# Patient Record
Sex: MALE | Race: WHITE | Employment: FULL TIME | URBAN - METROPOLITAN AREA
[De-identification: names, ages, dates, MRNs, and addresses within clinical notes are randomized per-mention and may not be internally consistent; named-entity substitution may affect disease eponyms.]

---

## 2017-05-31 ENCOUNTER — HOSPITAL ENCOUNTER (OUTPATIENT)
Dept: LAB | Age: 42
Discharge: HOME | End: 2017-05-31
Payer: COMMERCIAL

## 2017-05-31 ENCOUNTER — OFFICE VISIT (OUTPATIENT)
Dept: FAMILY MEDICINE CLINIC | Age: 42
End: 2017-05-31

## 2017-05-31 VITALS
DIASTOLIC BLOOD PRESSURE: 82 MMHG | HEART RATE: 64 BPM | BODY MASS INDEX: 27.85 KG/M2 | WEIGHT: 217 LBS | SYSTOLIC BLOOD PRESSURE: 108 MMHG | HEIGHT: 74 IN | TEMPERATURE: 97.7 F | OXYGEN SATURATION: 98 % | RESPIRATION RATE: 16 BRPM

## 2017-05-31 DIAGNOSIS — G57.02 PIRIFORMIS SYNDROME OF LEFT SIDE: ICD-10-CM
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
## 2017-05-31 DIAGNOSIS — S30.861A TICK BITE OF GROIN, INITIAL ENCOUNTER: Primary | ICD-10-CM

## 2017-05-31 PROCEDURE — 86618 LYME DISEASE ANTIBODY: CPT | Performed by: FAMILY MEDICINE

## 2017-05-31 PROCEDURE — 36415 COLL VENOUS BLD VENIPUNCTURE: CPT | Performed by: FAMILY MEDICINE

## 2017-05-31 RX ORDER — MELOXICAM 15 MG/1
TABLET ORAL
Qty: 90 TAB | Refills: 0 | Status: SHIPPED | OUTPATIENT
Start: 2017-05-31 | End: 2017-09-04 | Stop reason: SDUPTHER

## 2017-05-31 RX ORDER — DOXYCYCLINE 100 MG/1
100 TABLET ORAL 2 TIMES DAILY
Qty: 28 TAB | Refills: 0 | Status: SHIPPED | OUTPATIENT
Start: 2017-05-31 | End: 2017-09-17

## 2017-05-31 NOTE — MR AVS SNAPSHOT
Visit Information Date & Time Provider Department Dept. Phone Encounter #  
 5/31/2017 10:20 AM Sonny Malone, [address] [facility] [id number] Follow-up Instructions Return in about 4 weeks (around 6/28/2017) for muscle tightness, lyme concern. Upcoming Health Maintenance Date Due DTaP/Tdap/Td series (1 - Tdap) 7/14/1994 INFLUENZA AGE 9 TO ADULT 8/1/2017 Allergies as of 5/31/2017  Review Complete On: 5/31/2017 By: Sonny Malone DO Severity Noted Reaction Type Reactions Neomycin  05/15/2015    Unknown (comments) Current Immunizations  Never Reviewed No immunizations on file. Not reviewed this visit You Were Diagnosed With   
  
 Codes Comments Tick bite, initial encounter    -  Primary ICD-10-CM: W57. Jarett Pagan ICD-9-CM: 919.4, E906.4 Piriformis syndrome of left side     ICD-10-CM: G57.02 
ICD-9-CM: 355.0 Vitals BP Pulse Temp Resp Height(growth percentile) Weight(growth percentile) 108/82 (BP 1 Location: Left arm, BP Patient Position: Sitting) 64 97.7 °F (36.5 °C) (Oral) 16 6' 2\" (1.88 m) 217 lb (98.4 kg) SpO2 BMI Smoking Status 98% 27.86 kg/m2 Former Smoker BMI and BSA Data Body Mass Index Body Surface Area  
 27.86 kg/m 2 2.27 m 2 Preferred Pharmacy Pharmacy Name Phone [id number] [address] Your Updated Medication List  
  
   
This list is accurate as of: 5/31/17 10:55 AM.  Always use your most recent med list.  
  
  
  
  
 doxycycline 100 mg tablet Commonly known as:  ADOXA Take 1 Tab by mouth two (2) times a day. HYDROcodone-acetaminophen 5-325 mg per tablet Commonly known as:  Cathy Mandril Take 1-2 Tabs by mouth every eight (8) hours as needed for Pain. Max Daily Amount: 6 Tabs. ibuprofen 800 mg tablet Commonly known as:  MOTRIN  
 Take 1 Tab by mouth every eight (8) hours as needed for Pain.  
  
 meloxicam 15 mg tablet Commonly known as:  MOBIC Take 1 tab daily or 1/2 tab twice daily as needed pain with food. Prescriptions Sent to Pharmacy Refills  
 meloxicam (MOBIC) 15 mg tablet 0 Sig: Take 1 tab daily or 1/2 tab twice daily as needed pain with food. Class: Normal  
 Pharmacy: [facility] #[id number] [place] [address] Ph #: [phone]  
 doxycycline (ADOXA) 100 mg tablet 0 Sig: Take 1 Tab by mouth two (2) times a day. Class: Normal  
 Pharmacy: [facility]acy #[id number] [place] [address] Ph #: [phone] Route: Oral  
  
Follow-up Instructions Return in about 4 weeks (around 6/28/2017) for muscle tightness, lyme concern. To-Do List   
 05/31/2017 Lab:  LYME AB TOTAL W/RFLX W BLOT Patient Instructions   
[email or web address] Search YouTube for my channel: 
 
Dr. Sifuentes Spargilberto Low back/Piriformis Stretch each muscle 30-45 seconds 5-6 times a day and be sure to do both sides. Definitely stretch after exercise, before bed, and when waking up. do this stretch 5 seconds each time 5 times in a row 4-6 times/day Form roll on painful side 3-5 minutes 2-3 times daily. Tick Bite: Care Instructions Your Care Instructions Ticks are small spiderlike animals. They bite to fasten themselves onto your skin and feed on your blood. Ticks can carry diseases. But most ticks do not carry diseases, and most tick bites do not cause serious health problems. Some people may have an allergic reaction to a tick bite. This reaction may be mild, with symptoms like itching and swelling. In rare cases, a severe allergic reaction may occur. Most of the time, all you need to do for a tick bite is relieve any symptoms you may have. Follow-up care is a key part of your treatment and safety. Be sure to make and go to all appointments, and call your doctor if you are having problems. It's also a good idea to know your test results and keep a list of the medicines you take. How can you care for yourself at home? · Put ice or a cold pack on the bite for 15 to 20 minutes once an hour. Put a thin cloth between the ice and your skin. · Try an over-the-counter medicine to relieve itching, redness, swelling, and pain. Be safe with medicines. Read and follow all instructions on the label. ¨ Take an antihistamine medicine, such as chlorpheniramine (Chlor-Trimeton) or diphenhydramine (Benadryl). These medicines may help relieve itching, redness, and swelling. ¨ Use a spray of local anesthetic that contains benzocaine, such as Solarcaine. It may help relieve pain. If your skin reacts to the spray, stop using it. ¨ Put calamine lotion on the skin. It may help relieve itching. To avoid tick bites · Avoid ticks: 
¨ Learn where ticks are found in your community, and stay away from those areas if possible. ¨ Cover as much of your body as possible when you work or play in grassy or wooded areas. ¨ Use insect repellents, such as products containing DEET. You can spray them on your skin. ¨ Take steps to control ticks on your property if you live in an area where Lyme disease occurs. Clear leaves, brush, tall grasses, woodpiles, and stone fences from around your house and the edges of your yard or garden. This may help get rid of ticks. · When you come in from outdoors, check your body for ticks, including your groin, head, and underarms. The ticks may be about the size of a sesame seed. If no one else can help you check for ticks on your scalp, comb your hair with a fine-tooth comb. · If you find a tick, remove it quickly. Use tweezers to grasp the tick as close to its mouth (the part in your skin) as possible.  Slowly pull the tick straight outdo not twist or yankuntil its mouth releases from your skin. · Ticks can come into your house on clothing, outdoor gear, and pets. These ticks can fall off and attach to you. ¨ Check your clothing and outdoor gear. Remove any ticks you find. Then put your clothing in a clothes dryer on high heat for 1 hour to kill any ticks that might remain. ¨ Check your pets for ticks after they have been outdoors. · When hiking in the woods, carry a small dry jar or ziplock bag. If you find a tick on your body, remove the tick and put it in the jar or bag. Store the container in the freezer so you can give it to your doctor if symptoms develop. The tick can be tested to learn whether it is carrying the bacteria that cause Lyme disease. When should you call for help? Call 911 anytime you think you may need emergency care. For example, call if: 
· You have symptoms of a severe allergic reaction. These may include: 
¨ Sudden raised, red areas (hives) all over your body. ¨ Swelling of the throat, mouth, lips, or tongue. ¨ Trouble breathing. ¨ Passing out (losing consciousness). Or you may feel very lightheaded or suddenly feel weak, confused, or restless. Call your doctor now or seek immediate medical care if: 
· You have signs of infection, such as: 
¨ Increased pain, swelling, warmth, or redness around the bite. ¨ Red streaks leading from the bite. ¨ Pus draining from the bite. ¨ A fever. Watch closely for changes in your health, and be sure to contact your doctor if: 
· You develop a new rash. · You have joint pain. · You are very tired. · You have flu-like symptoms. · You have symptoms for more than 1 week. Where can you learn more? Go to http://brinda-beth.info/. Enter E175 in the search box to learn more about \"Tick Bite: Care Instructions. \" Current as of: May 27, 2016 Content Version: 11.2 © 8596-6655 [employer].  Care instructions adapted under license by [address] (which disclaims liability or warranty for this information). If you have questions about a medical condition or this instruction, always ask your healthcare professional. [address] any warranty or liability for your use of this information. Piriformis Syndrome: Care Instructions Your Care Instructions The piriformis muscle is deep under your rear end (buttock). One end of the muscle connects deep inside the pelvic area, and the other end attaches to the top of the thighbone. This muscle can press on the sciatic nerve that runs from your spine down your leg. When this happens, you may have pain, numbness, and tingling in the buttock and down the back of your leg. This is called piriformis syndrome. The pain may get worse when you sit for a long time or climb stairs. Also, you may be more likely to develop piriformis syndrome if you run or walk often. Your doctor will check for other causes of your pain before treating this syndrome. Treatment may include stretching exercises, massage, and medicine for the pain and swelling. If these do not help, you may get a shot of steroid medicine. Until the pain is gone, you may need to rest the muscle and limit activities like running. Exercises and a change in how you move and sit may be enough to stop the pressure on the nerve. Follow-up care is a key part of your treatment and safety. Be sure to make and go to all appointments, and call your doctor if you are having problems. It's also a good idea to know your test results and keep a list of the medicines you take. How can you care for yourself at home? · If your doctor thinks that strenuous exercise is causing your problem, stop or cut back on activities such as running. You may find swimming to be a good exercise for a while. · Stretch the piriformis muscle. Vergie Saha on your back. ¨ Bend one leg at the knee and keep the other leg flat on the ground. ¨ Raise your bent knee up and then move it across your body. Hold the outside of the knee with the opposite hand. ¨ Gently pull the knee with your hand toward the opposite shoulder. ¨ Hold the stretch for at least 15 to 30 seconds. Switch legs. ¨ Do the stretch several times each day. · Massage the muscle to relieve pressure. ¨ Sit on the floor. Lean to one side so that the hip on your sore side is off the ground. Put a tennis ball under your buttock on that side. ¨ As you put weight onto the tennis ball, you may find spots that are especially sore. Move gently so that the tennis ball gently massages each of the sore spots. · Use ice or heat to help reduce pain. Put ice or a cold pack or a heating pad set on low or a warm cloth on the sore area for 10 to 20 minutes at a time. Put a thin cloth between the ice pack or heating pad and your skin. · Ask your doctor if you can take an over-the-counter pain medicine, such as acetaminophen (Tylenol), ibuprofen (Advil, Motrin), or naproxen (Aleve). Be safe with medicines. Read and follow all instructions on the label. · Have your doctor or a physical therapist watch how you move. You may need physical therapy or special inserts in your shoes (orthotics) to help you move in a way that does not put pressure on your nerves. When should you call for help? Watch closely for changes in your health, and be sure to contact your doctor if: 
· You do not feel better after several weeks of home care. · Your pain gets worse. · Your leg becomes weak or numb. Where can you learn more? Go to http://brinda-beth.info/. Enter M652 in the search box to learn more about \"Piriformis Syndrome: Care Instructions. \" Current as of: May 23, 2016 Content Version: 11.2 © 8768-7331 [employer].  Care instructions adapted under license by [employer] (which disclaims liability or warranty for this information). If you have questions about a medical condition or this instruction, always ask your healthcare professional. [address] any warranty or liability for your use of this information. Introducing [facility] SERVICES! [employer] introduces DataParenting patient portal. Now you can access parts of your medical record, email your doctor's office, and request medication refills online. 1. In your internet browser, go to [email or web address] 2. Click on the First Time User? Click Here link in the Sign In box. You will see the New Member Sign Up page. 3. Enter your DataParenting Access Code exactly as it appears below. You will not need to use this code after youve completed the sign-up process. If you do not sign up before the expiration date, you must request a new code. · DataParenting Access Code: [email or web address] Expires: 8/29/2017 10:55 AM 
 
4. Enter the last four digits of your Social Security Number (xxxx) and Date of Birth (mm/dd/yyyy) as indicated and click Submit. You will be taken to the next sign-up page. 5. Create a DataParenting ID. This will be your DataParenting login ID and cannot be changed, so think of one that is secure and easy to remember. 6. Create a DataParenting password. You can change your password at any time. 7. Enter your Password Reset Question and Answer. This can be used at a later time if you forget your password. 8. Enter your e-mail address. You will receive e-mail notification when new information is available in [address] 9. Click Sign Up. You can now view and download portions of your medical record. 10. Click the Download Summary menu link to download a portable copy of your medical information. If you have questions, please visit the Frequently Asked Questions section of the DataParenting website. Remember, DataParenting is NOT to be used for urgent needs. For medical emergencies, dial 911. Now available from your iPhone and Android! Please provide this summary of care documentation to your next provider. Lyme Disease Testing Disclaimer:   
 § 36.1-8583.4. (Expires July 1, 2018) Lyme disease testing information disclosure. A. Every licensee or his in-office designee who orders a laboratory test for the presence of Lyme disease shall provide to the patient or his legal representative the following written information: \"ACCORDING TO THE CENTERS FOR DISEASE CONTROL AND PREVENTION, AS OF 2011 LYME DISEASE IS THE SIXTH FASTEST GROWING DISEASE IN THE [place]. YOUR HEALTH CARE PROVIDER HAS ORDERED A LABORATORY TEST FOR THE PRESENCE OF LYME DISEASE FOR YOU. CURRENT LABORATORY TESTING FOR LYME DISEASE CAN BE PROBLEMATIC AND STANDARD LABORATORY TESTS OFTEN RESULT IN FALSE NEGATIVE AND FALSE POSITIVE RESULTS, AND IF DONE TOO EARLY, YOU MAY NOT HAVE PRODUCED ENOUGH ANTIBODIES TO BE CONSIDERED POSITIVE BECAUSE YOUR IMMUNE RESPONSE REQUIRES TIME TO DEVELOP ANTIBODIES. IF YOU ARE TESTED FOR LYME DISEASE, AND THE RESULTS ARE NEGATIVE, THIS DOES NOT NECESSARILY MEAN YOU DO NOT HAVE LYME DISEASE. IF YOU CONTINUE TO EXPERIENCE SYMPTOMS, YOU SHOULD CONTACT YOUR HEALTH CARE PROVIDER AND INQUIRE ABOUT THE APPROPRIATENESS OF RETESTING OR ADDITIONAL TREATMENT. \"  
B. Licensees shall be immune from civil liability for the provision of the written information required by this section absent gross negligence or willful misconduct. Your primary care clinician is listed as [address] [place] [address]. If you have any questions after today's visit, please call [phone].

## 2017-05-31 NOTE — LETTER
6/2/2017 8:02 AM 
 
Mr. Tony Camacho [address] [address] [address] Dear Tony Camacho: 
 
Please find your most recent results below. Labs are within normal limits. Totally negative for lyme disease. Resulted Orders LYME AB TOTAL W/RFLX W BLOT Result Value Ref Range Lyme Ab, IgG/IgM <0.91 0.00 - 0.90 ISR Comment:  
   (NOTE) Negative         <0.91 Equivocal  0.91 - 1.09 Positive         >1.09 Performed At: [address] [id number] Larry Harris MD JL:[id number] Please call me if you have any questions: [phone] Sincerely, 
 
Valente Desouza

## 2017-05-31 NOTE — PROGRESS NOTES
HISTORY OF PRESENT ILLNESS    José Miguel Sands is a 35y.o. year old male comes in today as new patient to be evaluated and treated for: lyme concern    Has been bit by ticks several times as many tics in yard as a lot of woods. Has been to PCP prior and tested for lyme which was neg but \"not an extensive panel\". Had been bit by tick on right thigh inner thigh 2 months ago and seemed to get rash and swell after. No fever but some muscle aches especially left glute and leg and did feel flu-like a couple weeks later. No eval for this prior. Does do a lot of exercise and noticed that has made him more sore especially left glute. Current Outpatient Prescriptions   Medication Sig Dispense Refill    ibuprofen (MOTRIN) 800 mg tablet Take 1 Tab by mouth every eight (8) hours as needed for Pain. 20 Tab 0    HYDROcodone-acetaminophen (NORCO) 5-325 mg per tablet Take 1-2 Tabs by mouth every eight (8) hours as needed for Pain. Max Daily Amount: 6 Tabs. 20 Tab 0     Past Medical History:   Diagnosis Date    VD (venereal disease)      Family History   Problem Relation Age of Onset    Cancer Mother     Cancer Paternal Grandmother     Diabetes Paternal Grandmother      Social History     Social History    Marital status:      Spouse name: N/A    Number of children: N/A    Years of education: N/A     Social History Main Topics    Smoking status: Former Smoker    Smokeless tobacco: Never Used    Alcohol use Yes    Drug use: None    Sexual activity: Not Asked     Other Topics Concern    None     Social History Narrative       ROS:  No weight loss. All other systems reviewed and negative aside from that written in the HPI. OBJECTIVE:  Visit Vitals    /82 (BP 1 Location: Left arm, BP Patient Position: Sitting)    Pulse 64    Temp 97.7 °F (36.5 °C) (Oral)    Resp 16    Ht 6' 2\" (1.88 m)    Wt 217 lb (98.4 kg)    SpO2 98%    BMI 27.86 kg/m2     GEN: Appears stated age in NAD.    HEENT: Conjunctiva/lids WNL. External canals/nares WNL. Tongue midline. PERRL, EOMI. Hearing intact. NECK: Trachea midline. Supple, Full ROM. CARDIAC: RRR. S1S2. No Murmur. No peripheral edema. LUNGS: CTAB w/ normal effort. ABD: Soft, NT.   MS: No clubbing/cyanosis. Steady gait. Strength +5/5 all extremities with full ROM and good tone. Tight and TTP hip flexor left  NEURO: Sensation intact light touch. Good coordination. SKIN: Warm/dry w/o rash. PSYCH: A+O x3. Appropriate judgment and insight. Assessment/Plan:   Encounter Diagnoses   Name Primary?  Tick bite of groin, initial encounter Yes    Piriformis syndrome of left side      Orders Placed This Encounter    LYME AB TOTAL W/RFLX W BLOT     Standing Status:   Future     Number of Occurrences:   1     Standing Expiration Date:   6/1/2018    meloxicam (MOBIC) 15 mg tablet     Sig: Take 1 tab daily or 1/2 tab twice daily as needed pain with food. Dispense:  90 Tab     Refill:  0    doxycycline (ADOXA) 100 mg tablet     Sig: Take 1 Tab by mouth two (2) times a day. Dispense:  28 Tab     Refill:  0     Patient verbalizes understanding of evaluation and plan. Will start mobic PRN and stretch as demo and await lab which I will notify call/letter and finish 14 days doxy and RTC 3-4 weeks.

## 2017-05-31 NOTE — PATIENT INSTRUCTIONS
[email or web address]    Search YouTube for my channel:    Dr. Harry Simental    Low back/Piriformis                Stretch each muscle 30-45 seconds 5-6 times a day and be sure to do both sides. Definitely stretch after exercise, before bed, and when waking up. do this stretch 5 seconds each time 5 times in a row 4-6 times/day          Form roll on painful side 3-5 minutes 2-3 times daily. Tick Bite: Care Instructions  Your Care Instructions    Ticks are small spiderlike animals. They bite to fasten themselves onto your skin and feed on your blood. Ticks can carry diseases. But most ticks do not carry diseases, and most tick bites do not cause serious health problems. Some people may have an allergic reaction to a tick bite. This reaction may be mild, with symptoms like itching and swelling. In rare cases, a severe allergic reaction may occur. Most of the time, all you need to do for a tick bite is relieve any symptoms you may have. Follow-up care is a key part of your treatment and safety. Be sure to make and go to all appointments, and call your doctor if you are having problems. It's also a good idea to know your test results and keep a list of the medicines you take. How can you care for yourself at home? · Put ice or a cold pack on the bite for 15 to 20 minutes once an hour. Put a thin cloth between the ice and your skin. · Try an over-the-counter medicine to relieve itching, redness, swelling, and pain. Be safe with medicines. Read and follow all instructions on the label. ¨ Take an antihistamine medicine, such as chlorpheniramine (Chlor-Trimeton) or diphenhydramine (Benadryl). These medicines may help relieve itching, redness, and swelling. ¨ Use a spray of local anesthetic that contains benzocaine, such as Solarcaine. It may help relieve pain. If your skin reacts to the spray, stop using it. ¨ Put calamine lotion on the skin.  It may help relieve itching. To avoid tick bites  · Avoid ticks:  ¨ Learn where ticks are found in your community, and stay away from those areas if possible. ¨ Cover as much of your body as possible when you work or play in grassy or wooded areas. ¨ Use insect repellents, such as products containing DEET. You can spray them on your skin. ¨ Take steps to control ticks on your property if you live in an area where Lyme disease occurs. Clear leaves, brush, tall grasses, woodpiles, and stone fences from around your house and the edges of your yard or garden. This may help get rid of ticks. · When you come in from outdoors, check your body for ticks, including your groin, head, and underarms. The ticks may be about the size of a sesame seed. If no one else can help you check for ticks on your scalp, comb your hair with a fine-tooth comb. · If you find a tick, remove it quickly. Use tweezers to grasp the tick as close to its mouth (the part in your skin) as possible. Slowly pull the tick straight out--do not twist or yank--until its mouth releases from your skin. · Ticks can come into your house on clothing, outdoor gear, and pets. These ticks can fall off and attach to you. ¨ Check your clothing and outdoor gear. Remove any ticks you find. Then put your clothing in a clothes dryer on high heat for 1 hour to kill any ticks that might remain. ¨ Check your pets for ticks after they have been outdoors. · When hiking in the woods, carry a small dry jar or ziplock bag. If you find a tick on your body, remove the tick and put it in the jar or bag. Store the container in the freezer so you can give it to your doctor if symptoms develop. The tick can be tested to learn whether it is carrying the bacteria that cause Lyme disease. When should you call for help? Call 911 anytime you think you may need emergency care. For example, call if:  · You have symptoms of a severe allergic reaction.  These may include:  ¨ Sudden raised, red areas (hives) all over your body. ¨ Swelling of the throat, mouth, lips, or tongue. ¨ Trouble breathing. ¨ Passing out (losing consciousness). Or you may feel very lightheaded or suddenly feel weak, confused, or restless. Call your doctor now or seek immediate medical care if:  · You have signs of infection, such as:  ¨ Increased pain, swelling, warmth, or redness around the bite. ¨ Red streaks leading from the bite. ¨ Pus draining from the bite. ¨ A fever. Watch closely for changes in your health, and be sure to contact your doctor if:  · You develop a new rash. · You have joint pain. · You are very tired. · You have flu-like symptoms. · You have symptoms for more than 1 week. Where can you learn more? Go to http://brinda-beth.info/. Enter G859 in the search box to learn more about \"Tick Bite: Care Instructions. \"  Current as of: May 27, 2016  Content Version: 11.2  © 7181-8100 [employer]. Care instructions adapted under license by [employer] (which disclaims liability or warranty for this information). If you have questions about a medical condition or this instruction, always ask your healthcare professional. [place] [address] any warranty or liability for your use of this information. Piriformis Syndrome: Care Instructions  Your Care Instructions    The piriformis muscle is deep under your rear end (buttock). One end of the muscle connects deep inside the pelvic area, and the other end attaches to the top of the thighbone. This muscle can press on the sciatic nerve that runs from your spine down your leg. When this happens, you may have pain, numbness, and tingling in the buttock and down the back of your leg. This is called piriformis syndrome. The pain may get worse when you sit for a long time or climb stairs. Also, you may be more likely to develop piriformis syndrome if you run or walk often.   Your doctor will check for other causes of your pain before treating this syndrome. Treatment may include stretching exercises, massage, and medicine for the pain and swelling. If these do not help, you may get a shot of steroid medicine. Until the pain is gone, you may need to rest the muscle and limit activities like running. Exercises and a change in how you move and sit may be enough to stop the pressure on the nerve. Follow-up care is a key part of your treatment and safety. Be sure to make and go to all appointments, and call your doctor if you are having problems. It's also a good idea to know your test results and keep a list of the medicines you take. How can you care for yourself at home? · If your doctor thinks that strenuous exercise is causing your problem, stop or cut back on activities such as running. You may find swimming to be a good exercise for a while. · Stretch the piriformis muscle. Bernice Dawley on your back. ¨ Bend one leg at the knee and keep the other leg flat on the ground. ¨ Raise your bent knee up and then move it across your body. Hold the outside of the knee with the opposite hand. ¨ Gently pull the knee with your hand toward the opposite shoulder. ¨ Hold the stretch for at least 15 to 30 seconds. Switch legs. ¨ Do the stretch several times each day. · Massage the muscle to relieve pressure. ¨ Sit on the floor. Lean to one side so that the hip on your sore side is off the ground. Put a tennis ball under your buttock on that side. ¨ As you put weight onto the tennis ball, you may find spots that are especially sore. Move gently so that the tennis ball gently massages each of the sore spots. · Use ice or heat to help reduce pain. Put ice or a cold pack or a heating pad set on low or a warm cloth on the sore area for 10 to 20 minutes at a time. Put a thin cloth between the ice pack or heating pad and your skin.   · Ask your doctor if you can take an over-the-counter pain medicine, such as acetaminophen (Tylenol), ibuprofen (Advil, Motrin), or naproxen (Aleve). Be safe with medicines. Read and follow all instructions on the label. · Have your doctor or a physical therapist watch how you move. You may need physical therapy or special inserts in your shoes (orthotics) to help you move in a way that does not put pressure on your nerves. When should you call for help? Watch closely for changes in your health, and be sure to contact your doctor if:  · You do not feel better after several weeks of home care. · Your pain gets worse. · Your leg becomes weak or numb. Where can you learn more? Go to http://brinda-beth.info/. Enter R221 in the search box to learn more about \"Piriformis Syndrome: Care Instructions. \"  Current as of: May 23, 2016  Content Version: 11.2  © 1856-5117 [employer]. Care instructions adapted under license by [employer] (which disclaims liability or warranty for this information). If you have questions about a medical condition or this instruction, always ask your healthcare professional. [address] any warranty or liability for your use of this information.

## 2017-06-01 LAB — B BURGDOR IGG+IGM SER-ACNC: <0.91 ISR (ref 0–0.9)

## 2017-06-01 NOTE — PROGRESS NOTES
Please send letter or call Pt and let them know labs are within normal limits. --Totally negative for lyme disease.

## 2017-07-24 ENCOUNTER — HOSPITAL ENCOUNTER (OUTPATIENT)
Dept: GENERAL RADIOLOGY | Age: 42
Discharge: HOME | End: 2017-07-24
Payer: COMMERCIAL

## 2017-07-24 DIAGNOSIS — M79.644 PAIN OF RIGHT MIDDLE FINGER: ICD-10-CM

## 2017-07-24 PROCEDURE — 73130 X-RAY EXAM OF HAND: CPT

## 2017-09-04 DIAGNOSIS — G57.02 PIRIFORMIS SYNDROME OF LEFT SIDE: ICD-10-CM

## 2017-09-05 RX ORDER — MELOXICAM 15 MG/1
TABLET ORAL
Qty: 30 TAB | Refills: 0 | Status: SHIPPED | OUTPATIENT
Start: 2017-09-05 | End: 2017-09-17

## 2017-09-17 ENCOUNTER — HOSPITAL ENCOUNTER (EMERGENCY)
Age: 42
Discharge: HOME | End: 2017-09-17
Attending: EMERGENCY MEDICINE
Payer: COMMERCIAL

## 2017-09-17 VITALS
OXYGEN SATURATION: 97 % | DIASTOLIC BLOOD PRESSURE: 82 MMHG | SYSTOLIC BLOOD PRESSURE: 133 MMHG | WEIGHT: 215 LBS | BODY MASS INDEX: 27.59 KG/M2 | RESPIRATION RATE: 16 BRPM | TEMPERATURE: 97.7 F | HEART RATE: 63 BPM | HEIGHT: 74 IN
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
## 2017-09-17 PROCEDURE — 99281 EMR DPT VST MAYX REQ PHY/QHP: CPT

## 2017-09-17 RX ORDER — PERMETHRIN 50 MG/G
CREAM TOPICAL
Qty: 60 G | Refills: 0 | Status: SHIPPED | OUTPATIENT
Start: 2017-09-17

## 2017-09-17 RX ORDER — CITALOPRAM 10 MG/1
10 TABLET ORAL DAILY
COMMUNITY
End: 2021-03-24

## 2017-09-17 NOTE — ED TRIAGE NOTES
Pt presents to the ED with CC of chiggers bite onset x2 days ago. Pt reports cleaning yard approximately x3-4 days ago, states noticed increasing area of skin problem, states moving upward.

## 2017-09-17 NOTE — ED PROVIDER NOTES
Patient is a 38 y.o. male presenting with Insect Bite and skin problem. The history is provided by the patient. Insect Bite     Skin Problem       Ramesh Rice is a 38 y.o. male presents with c/o insect bites on arms and legs. Was working out in yard the other day. Started 2 days ago. Denies fever or n/v.    Past Medical History:   Diagnosis Date    Anxiety with depression     VD (venereal disease)        Past Surgical History:   Procedure Laterality Date    HX SEPTOPLASTY      HX WISDOM TEETH EXTRACTION           Family History:   Problem Relation Age of Onset    Cancer Mother     Cancer Paternal Grandmother     Diabetes Paternal Grandmother        Social History     Social History    Marital status:      Spouse name: N/A    Number of children: N/A    Years of education: N/A     Occupational History    Not on file. Social History Main Topics    Smoking status: Former Smoker    Smokeless tobacco: Never Used    Alcohol use Yes    Drug use: No    Sexual activity: Not on file     Other Topics Concern    Not on file     Social History Narrative         ALLERGIES: Neomycin    Review of Systems  Constitutional:  Denies malaise, fever, chills. Head:  Denies injury. Face:  Denies injury or pain. ENMT:  Denies sore throat. Neck:  Denies injury or pain. Chest:  Denies injury. Cardiac:  Denies chest pain or palpitations. Respiratory:  Denies cough, wheezing, difficulty breathing, shortness of breath. GI/ABD:  Denies injury, pain, distention, nausea, vomiting, diarrhea. :  Denies injury, pain, dysuria or urgency. Back:  Denies injury or pain. Pelvis:  Denies injury or pain. Extremity/MS:  Denies injury or pain. Neuro:  Denies headache, LOC, dizziness, neurologic symptoms/deficits/paresthesias.    Skin: Rash    Vitals:    09/17/17 1019   BP: 133/82   Pulse: 63   Resp: 16   Temp: 97.7 °F (36.5 °C)   SpO2: 97%   Weight: 97.5 kg (215 lb)   Height: 6' 2\" (1.88 m) Physical Exam   Nursing note and vitals reviewed. CONSTITUTIONAL: Alert, in no apparent distress; well-developed; well-nourished. HEAD:  Normocephalic, atraumatic. EYES: PERRL; EOM's intact. ENTM: Nose: No rhinorrhea; Throat: mucous membranes moist. Posterior pharynx-normal.  Neck:  No JVD, supple without lymphadenopathy. RESP: Chest clear, equal breath sounds. CV: S1 and S2 WNL; No murmurs, gallops or rubs. UPPER EXT:  Normal inspection. LOWER EXT: Normal inspection. NEURO: strength 5/5 and sym, sensation intact. SKIN: multiple linear erythematous papules resembling insect bites over feet up to ankles, some on arms  PSYCH:  Alert and oriented, normal affect. MDM  Number of Diagnoses or Management Options  Insect bite, initial encounter:   Diagnosis management comments: DDx: eczema/allergic dermatitis/contact dermatitis, erysipelas, bug bites, abscess, cellulitis, viral exanthem, scabies,  skin eruption associated with life-threatening condition  IMPRESSION AND MEDICAL DECISION MAKING:  Based upon the patient's presentation with noted HPI and PE, along with the work up done in the emergency department, I believe that the patient has insect bites of some sort. Will treat and have him follow upw ith his PCP a sneeded. The patient will be discharged home. Warning signs of worsening condition were discussed and understood by the patient. Based on patient's age, coexisting illness, exam, and the results of this ED evaluation, the decision to treat as an outpatient was made. Based on the information available at time of discharge, acute pathology requiring immediate intervention was deemed relative unlikely. While it is impossible to completely exclude the possibility of underlying serious disease or worsening of condition, I feel the relative likelihood is extremely low.  I discussed this uncertainty with the patient, who understood ED evaluation and treatment and felt comfortable with the outpatient treatment plan. All questions regarding care, test results, and follow up were answered. The patient is stable and appropriate to discharge. They understand that they should return to the emergency department for any new or worsening symptoms. I stressed the importance of follow up for repeat assessment and possibly further evaluation/treatment. ED Course       Procedures      Vitals:  Patient Vitals for the past 12 hrs:   Temp Pulse Resp BP SpO2   09/17/17 1019 97.7 °F (36.5 °C) 63 16 133/82 97 %         Medications ordered:   Medications - No data to display      Lab findings:  No results found for this or any previous visit (from the past 12 hour(s)). EKG interpretation by ED Physician:      X-Ray, CT or other radiology findings or impressions:  No orders to display       Progress notes, Consult notes or additional Procedure notes:       Disposition:  Diagnosis:   1. Insect bite, initial encounter        Disposition:   11:03 AM  Pt reevaluated at this time and is resting comfortably in NAD. Discussed results and findings, as well as, diagnosis and plan for discharge. Pt verbalizes understanding and agreement with plan. All questions addressed at this time. Follow-up Information     Follow up With Details Comments MD Daniella Schedule an appointment as soon as possible for a visit in 3 days As needed [address] [facility] [id number] [address] EMERGENCY DEPT  If symptoms worsen [address] [facility]  [phone]           Patient's Medications   Start Taking    PERMETHRIN (ELIMITE) 5 % TOPICAL CREAM    apply sparingly as directed   Continue Taking    CITALOPRAM (CELEXA) 10 MG TABLET    Take 10 mg by mouth daily.  Indications: ANXIETY WITH DEPRESSION   These Medications have changed    No medications on file   Stop Taking    DOXYCYCLINE (ADOXA) 100 MG TABLET    Take 1 Tab by mouth two (2) times a day. HYDROCODONE-ACETAMINOPHEN (NORCO) 5-325 MG PER TABLET    Take 1-2 Tabs by mouth every eight (8) hours as needed for Pain. Max Daily Amount: 6 Tabs. IBUPROFEN (MOTRIN) 800 MG TABLET    Take 1 Tab by mouth every eight (8) hours as needed for Pain. MELOXICAM (MOBIC) 15 MG TABLET    TAKE 1 TAB DAILY OR 1/2 TAB TWICE DAILY AS NEEDED PAIN WITH FOOD.

## 2018-01-10 ENCOUNTER — HOSPITAL ENCOUNTER (EMERGENCY)
Age: 43
Discharge: HOME | End: 2018-01-10
Attending: EMERGENCY MEDICINE
Payer: COMMERCIAL

## 2018-01-10 VITALS
HEIGHT: 74 IN | BODY MASS INDEX: 26.95 KG/M2 | TEMPERATURE: 97.7 F | HEART RATE: 62 BPM | OXYGEN SATURATION: 99 % | DIASTOLIC BLOOD PRESSURE: 81 MMHG | WEIGHT: 210 LBS | SYSTOLIC BLOOD PRESSURE: 116 MMHG | RESPIRATION RATE: 18 BRPM

## 2018-01-10 DIAGNOSIS — R42 VERTIGO: Primary | ICD-10-CM

## 2018-01-10 LAB
ANION GAP SERPL CALC-SCNC: 8 MMOL/L (ref 3–18)
ATRIAL RATE: 59 BPM
BASOPHILS # BLD: 0 K/UL (ref 0–0.06)
BASOPHILS NFR BLD: 1 % (ref 0–2)
BUN SERPL-MCNC: 16 MG/DL (ref 7–18)
BUN/CREAT SERPL: 17 (ref 12–20)
CALCIUM SERPL-MCNC: 8.9 MG/DL (ref 8.5–10.1)
CALCULATED P AXIS, ECG09: 42 DEGREES
CALCULATED R AXIS, ECG10: 51 DEGREES
CALCULATED T AXIS, ECG11: 47 DEGREES
CHLORIDE SERPL-SCNC: 106 MMOL/L (ref 100–108)
CO2 SERPL-SCNC: 28 MMOL/L (ref 21–32)
CREAT SERPL-MCNC: 0.96 MG/DL (ref 0.6–1.3)
DIAGNOSIS, 93000: NORMAL
DIFFERENTIAL METHOD BLD: ABNORMAL
EOSINOPHIL # BLD: 0.3 K/UL (ref 0–0.4)
EOSINOPHIL NFR BLD: 5 % (ref 0–5)
ERYTHROCYTE [DISTWIDTH] IN BLOOD BY AUTOMATED COUNT: 12.4 % (ref 11.6–14.5)
GLUCOSE SERPL-MCNC: 108 MG/DL (ref 74–99)
HCT VFR BLD AUTO: 41.8 % (ref 36–48)
HGB BLD-MCNC: 14 G/DL (ref 13–16)
LYMPHOCYTES # BLD: 2.2 K/UL (ref 0.9–3.6)
LYMPHOCYTES NFR BLD: 38 % (ref 21–52)
MCH RBC QN AUTO: 30.6 PG (ref 24–34)
MCHC RBC AUTO-ENTMCNC: 33.5 G/DL (ref 31–37)
MCV RBC AUTO: 91.3 FL (ref 74–97)
MONOCYTES # BLD: 0.5 K/UL (ref 0.05–1.2)
MONOCYTES NFR BLD: 9 % (ref 3–10)
NEUTS SEG # BLD: 2.8 K/UL (ref 1.8–8)
NEUTS SEG NFR BLD: 47 % (ref 40–73)
P-R INTERVAL, ECG05: 204 MS
PLATELET # BLD AUTO: 203 K/UL (ref 135–420)
PMV BLD AUTO: 9.6 FL (ref 9.2–11.8)
POTASSIUM SERPL-SCNC: 3.8 MMOL/L (ref 3.5–5.5)
Q-T INTERVAL, ECG07: 406 MS
QRS DURATION, ECG06: 106 MS
QTC CALCULATION (BEZET), ECG08: 401 MS
RBC # BLD AUTO: 4.58 M/UL (ref 4.7–5.5)
SODIUM SERPL-SCNC: 142 MMOL/L (ref 136–145)
VENTRICULAR RATE, ECG03: 59 BPM
WBC # BLD AUTO: 5.8 K/UL (ref 4.6–13.2)

## 2018-01-10 PROCEDURE — 93005 ELECTROCARDIOGRAM TRACING: CPT

## 2018-01-10 PROCEDURE — 74011636637 HC RX REV CODE- 636/637: Performed by: EMERGENCY MEDICINE

## 2018-01-10 PROCEDURE — 99284 EMERGENCY DEPT VISIT MOD MDM: CPT

## 2018-01-10 PROCEDURE — 85025 COMPLETE CBC W/AUTO DIFF WBC: CPT | Performed by: EMERGENCY MEDICINE

## 2018-01-10 PROCEDURE — 74011250637 HC RX REV CODE- 250/637: Performed by: EMERGENCY MEDICINE

## 2018-01-10 PROCEDURE — 80048 BASIC METABOLIC PNL TOTAL CA: CPT | Performed by: EMERGENCY MEDICINE

## 2018-01-10 RX ORDER — PREDNISONE 20 MG/1
60 TABLET ORAL
Status: COMPLETED | OUTPATIENT
Start: 2018-01-10 | End: 2018-01-10

## 2018-01-10 RX ORDER — PREDNISONE 50 MG/1
50 TABLET ORAL DAILY
Qty: 4 TAB | Refills: 0 | Status: SHIPPED | OUTPATIENT
Start: 2018-01-10 | End: 2018-01-14

## 2018-01-10 RX ORDER — DIAZEPAM 5 MG/1
2.5 TABLET ORAL
Qty: 20 TAB | Refills: 0 | Status: SHIPPED | OUTPATIENT
Start: 2018-01-10 | End: 2021-03-24

## 2018-01-10 RX ORDER — DIAZEPAM 5 MG/1
2.5 TABLET ORAL
Status: COMPLETED | OUTPATIENT
Start: 2018-01-10 | End: 2018-01-10

## 2018-01-10 RX ORDER — MOMETASONE FUROATE 50 UG/1
2 SPRAY, METERED NASAL DAILY
COMMUNITY

## 2018-01-10 RX ORDER — FEXOFENADINE HCL AND PSEUDOEPHEDRINE HCI 180; 240 MG/1; MG/1
1 TABLET, EXTENDED RELEASE ORAL DAILY
COMMUNITY

## 2018-01-10 RX ADMIN — DIAZEPAM 2.5 MG: 5 TABLET ORAL at 07:50

## 2018-01-10 RX ADMIN — PREDNISONE 60 MG: 20 TABLET ORAL at 07:49

## 2018-01-10 NOTE — DISCHARGE INSTRUCTIONS
Vertigo: Care Instructions  Your Care Instructions    Vertigo is the feeling that you or your surroundings are moving when there is no actual movement. It is often described as a feeling of spinning, whirling, falling, or tilting. Vertigo may make you vomit or feel nauseated. You may have trouble standing or walking and may lose your balance. Vertigo is often related to an inner ear problem, but it can have other more serious causes. If vertigo continues, you may need more tests to find its cause. Follow-up care is a key part of your treatment and safety. Be sure to make and go to all appointments, and call your doctor if you are having problems. It's also a good idea to know your test results and keep a list of the medicines you take. How can you care for yourself at home? · Do not lie flat on your back. Prop yourself up slightly. This may reduce the spinning feeling. Keep your eyes open. · Move slowly so that you do not fall. · If your doctor recommends medicine, take it exactly as directed. · Do not drive while you are having vertigo. Certain exercises, called Gomez-Daroff exercises, can help decrease vertigo. To do Gomez-Daroff exercises:  · Sit on the edge of a bed or sofa and quickly lie down on the side that causes the worst vertigo. Lie on your side with your ear down. · Stay in this position for at least 30 seconds or until the vertigo goes away. · Sit up. If this causes vertigo, wait for it to stop. · Repeat the procedure on the other side. · Repeat this 10 times. Do these exercises 2 times a day until the vertigo is gone. When should you call for help? Call 911 anytime you think you may need emergency care. For example, call if:  ? · You passed out (lost consciousness). ? · You have symptoms of a stroke. These may include:  ¨ Sudden numbness, tingling, weakness, or loss of movement in your face, arm, or leg, especially on only one side of your body.   ¨ Sudden vision changes. ¨ Sudden trouble speaking. ¨ Sudden confusion or trouble understanding simple statements. ¨ Sudden problems with walking or balance. ¨ A sudden, severe headache that is different from past headaches. ?Call your doctor now or seek immediate medical care if:  ? · Vertigo occurs with a fever, a headache, or ringing in your ears. ? · You have new or increased nausea and vomiting. ? Watch closely for changes in your health, and be sure to contact your doctor if:  ? · Vertigo gets worse or happens more often. ? · Vertigo has not gotten better after 2 weeks. Where can you learn more? Go to http://brinad-beth.info/. Enter B107 in the search box to learn more about \"Vertigo: Care Instructions. \"  Current as of: May 12, 2017  Content Version: 11.4  © 4358-2484 [employer]. Care instructions adapted under license by [employer] (which disclaims liability or warranty for this information). If you have questions about a medical condition or this instruction, always ask your healthcare professional. [address] any warranty or liability for your use of this information. The Ear: Anatomy Sketch    Current as of: May 12, 2017  Content Version: 11.4  © 1175-6487 [employer]. Care instructions adapted under license by [employer] (which disclaims liability or warranty for this information). If you have questions about a medical condition or this instruction, always ask your healthcare professional. [address] any warranty or liability for your use of this information.

## 2018-01-10 NOTE — ED TRIAGE NOTES
Patient has been having dizzy spells since Oct/Nov. Was referred to ENT 12/19 and Dx with sinus issues.  States medications prescribed not helping

## 2018-01-10 NOTE — ED PROVIDER NOTES
EMERGENCY DEPARTMENT HISTORY AND PHYSICAL EXAM    7:18 AM      Date: 1/10/2018  Patient Name: Willie Larkin    History of Presenting Illness     Chief Complaint   Patient presents with    Dizziness         History Provided By: Patient and Patient's Wife    Chief Complaint: Dizziness  Duration: 2 Months  Timing:  Intermittent  Location:   Quality: Equilibrium   Severity: Moderate  Modifying Factors: laying down makes worse, ENT visit, Nasonex, Allegra D, Meclozine  Associated Symptoms: decreased focus, HA, watery eyes, eye pressure, bilateral ear pressure, nausea. Denies emesis or facial pressure      Additional History (Context): Willie Larkin is a 38 y.o. male with shx of Septoplasty who presents with with c/o intermittent moderate equilibrium dizziness onset 2 months. Pt states associated sx of decreased focus, mild HA, watery eyes, eye pressure, and bilateral ear pressure. Pt reports that nausea just began this morning. Denies emesis or facial pressure. Pt states seeing ENT in [place] and told sx were from allergies; however, when treated with Nasonex and Allegra D, sx still persist.  Pt reports PCP Rx of Meclozine without relief of sx. Pt reports that laying down increases sx. Pt states having similar sx several years ago but not as bad. Wife denies pt oral steroid use. PCP: Ceci Roldan MD    Current Outpatient Prescriptions   Medication Sig Dispense Refill    fexofenadine-pseudoephedrine (ALLEGRA-D 24 HOUR) 180-240 mg per tablet Take 1 Tab by mouth daily.  mometasone (NASONEX) 50 mcg/actuation nasal spray 2 Sprays daily.  predniSONE (DELTASONE) 50 mg tablet Take 1 Tab by mouth daily for 4 days. 4 Tab 0    diazePAM (VALIUM) 5 mg tablet Take 0.5 Tabs by mouth every eight (8) hours as needed (vertigo). Max Daily Amount: 7.5 mg. 20 Tab 0    citalopram (CELEXA) 10 mg tablet Take 10 mg by mouth daily.  Indications: ANXIETY WITH DEPRESSION      permethrin (ELIMITE) 5 % topical cream apply sparingly as directed 60 g 0       Past History     Past Medical History:  Past Medical History:   Diagnosis Date    Anxiety with depression     VD (venereal disease)        Past Surgical History:  Past Surgical History:   Procedure Laterality Date    HX SEPTOPLASTY      HX WISDOM TEETH EXTRACTION         Family History:  Family History   Problem Relation Age of Onset    Cancer Mother     Cancer Paternal Grandmother     Diabetes Paternal Grandmother        Social History:  Social History   Substance Use Topics    Smoking status: Former Smoker    Smokeless tobacco: Never Used    Alcohol use Yes       Allergies: Allergies   Allergen Reactions    Neomycin Unknown (comments)         Review of Systems       Review of Systems   Constitutional: Negative for chills and fever. HENT: Positive for ear pain (pressure). Negative for sinus pressure (facial pressure). Eyes: Positive for discharge (watery bilateral). Respiratory: Negative for shortness of breath. Cardiovascular: Negative for chest pain. Gastrointestinal: Positive for nausea. Negative for diarrhea and vomiting. Neurological: Positive for dizziness and headaches. Psychiatric/Behavioral: Positive for decreased concentration. All other systems reviewed and are negative. Physical Exam     Visit Vitals    BP (!) 133/92 (BP 1 Location: Left arm, BP Patient Position: At rest)    Pulse 62    Temp 97.7 °F (36.5 °C)    Resp 18    Ht 6' 2\" (1.88 m)    Wt 95.3 kg (210 lb)    SpO2 95%    BMI 26.96 kg/m2         Physical Exam   Constitutional: He is oriented to person, place, and time. He appears well-developed and well-nourished. No distress. HENT:   Head: Normocephalic and atraumatic. Right Ear: Tympanic membrane is bulging (with clear fluid). Left Ear: Tympanic membrane is bulging (with clear fluid). Eyes: Conjunctivae and EOM are normal. Right eye exhibits no discharge. Left eye exhibits no discharge. No scleral icterus. Neck: Normal range of motion. Neck supple. No tracheal deviation present. Cardiovascular: Normal rate, regular rhythm and normal heart sounds. No murmur heard. Pulmonary/Chest: Effort normal and breath sounds normal. No respiratory distress. He has no wheezes. He has no rales. Abdominal: Soft. He exhibits no distension. There is no tenderness. There is no rebound and no guarding. Musculoskeletal: Normal range of motion. He exhibits no edema or deformity. Neurological: He is alert and oriented to person, place, and time. No cranial nerve deficit. Skin: Skin is warm and dry. He is not diaphoretic. Psychiatric: He has a normal mood and affect. His behavior is normal. Judgment and thought content normal.         Diagnostic Study Results     Labs -  Recent Results (from the past 12 hour(s))   EKG, 12 LEAD, INITIAL    Collection Time: 01/10/18  7:16 AM   Result Value Ref Range    Ventricular Rate 59 BPM    Atrial Rate 59 BPM    P-R Interval 204 ms    QRS Duration 106 ms    Q-T Interval 406 ms    QTC Calculation (Bezet) 401 ms    Calculated P Axis 42 degrees    Calculated R Axis 51 degrees    Calculated T Axis 47 degrees    Diagnosis       Sinus bradycardia  Incomplete right bundle branch block  Borderline ECG  No previous ECGs available     CBC WITH AUTOMATED DIFF    Collection Time: 01/10/18  7:35 AM   Result Value Ref Range    WBC 5.8 4.6 - 13.2 K/uL    RBC 4.58 (L) 4.70 - 5.50 M/uL    HGB 14.0 13.0 - 16.0 g/dL    HCT 41.8 36.0 - 48.0 %    MCV 91.3 74.0 - 97.0 FL    MCH 30.6 24.0 - 34.0 PG    MCHC 33.5 31.0 - 37.0 g/dL    RDW 12.4 11.6 - 14.5 %    PLATELET 873 785 - 835 K/uL    MPV 9.6 9.2 - 11.8 FL    NEUTROPHILS 47 40 - 73 %    LYMPHOCYTES 38 21 - 52 %    MONOCYTES 9 3 - 10 %    EOSINOPHILS 5 0 - 5 %    BASOPHILS 1 0 - 2 %    ABS. NEUTROPHILS 2.8 1.8 - 8.0 K/UL    ABS. LYMPHOCYTES 2.2 0.9 - 3.6 K/UL    ABS. MONOCYTES 0.5 0.05 - 1.2 K/UL    ABS. EOSINOPHILS 0.3 0.0 - 0.4 K/UL    ABS.  BASOPHILS 0.0 0.0 - 0.06 K/UL    DF AUTOMATED     METABOLIC PANEL, BASIC    Collection Time: 01/10/18  7:35 AM   Result Value Ref Range    Sodium 142 136 - 145 mmol/L    Potassium 3.8 3.5 - 5.5 mmol/L    Chloride 106 100 - 108 mmol/L    CO2 28 21 - 32 mmol/L    Anion gap 8 3.0 - 18 mmol/L    Glucose 108 (H) 74 - 99 mg/dL    BUN 16 7.0 - 18 MG/DL    Creatinine 0.96 0.6 - 1.3 MG/DL    BUN/Creatinine ratio 17 12 - 20      GFR est AA >60 >60 ml/min/1.73m2    GFR est non-AA >60 >60 ml/min/1.73m2    Calcium 8.9 8.5 - 10.1 MG/DL       Radiologic Studies -   No orders to display         Medical Decision Making   I am the first provider for this patient. I reviewed the vital signs, available nursing notes, past medical history, past surgical history, family history and social history. Vital Signs-Reviewed the patient's vital signs. EKG: Interpreted by the EP. Time Interpreted: 0716   Rate: 59 bpm   Rhythm: Sinus Bradycardia   Interpretation: Sinus Bradycardia. Incomplete RBBB. No STEMI         Records Reviewed: Nursing Notes (Time of Review: 7:30 AM)    Provider Notes (Medical Decision Making): Pt with otitis media with effusion b/l. On allegra d, nasocort, abx, antivert from ENT with no improvement. Will start course of steroids and PRN valium with referral back to ENT. Diagnosis     Clinical Impression:   1. Vertigo        Disposition: home    Follow-up Information     Follow up With Details Comments MD Daniella   [address] [place], [place] [id number]  [phone]      [facility] EMERGENCY DEPT   [address] [place] [address]  [phone]           Patient's Medications   Start Taking    DIAZEPAM (VALIUM) 5 MG TABLET    Take 0.5 Tabs by mouth every eight (8) hours as needed (vertigo). Max Daily Amount: 7.5 mg. PREDNISONE (DELTASONE) 50 MG TABLET    Take 1 Tab by mouth daily for 4 days.    Continue Taking    CITALOPRAM (CELEXA) 10 MG TABLET    Take 10 mg by mouth daily. Indications: ANXIETY WITH DEPRESSION    FEXOFENADINE-PSEUDOEPHEDRINE (ALLEGRA-D 24 HOUR) 180-240 MG PER TABLET    Take 1 Tab by mouth daily. MOMETASONE (NASONEX) 50 MCG/ACTUATION NASAL SPRAY    2 Sprays daily. PERMETHRIN (ELIMITE) 5 % TOPICAL CREAM    apply sparingly as directed   These Medications have changed    No medications on file   Stop Taking    No medications on file     _______________________________    Attestations:  [address] Brayden Andre acting as a scribe for and in the presence of Ab Bryant MD      January 10, 2018 at 7:30 AM       Provider Attestation:      I personally performed the services described in the documentation, reviewed the documentation, as recorded by the scribe in my presence, and it accurately and completely records my words and actions.  January 10, 2018 at 7:30 AM - Ab Bryant MD    _______________________________

## 2023-09-18 ENCOUNTER — HOSPITAL ENCOUNTER (EMERGENCY)
Facility: HOSPITAL | Age: 48
Discharge: HOME | End: 2023-09-18
Attending: EMERGENCY MEDICINE
Payer: COMMERCIAL

## 2023-09-18 ENCOUNTER — APPOINTMENT (OUTPATIENT)
Facility: HOSPITAL | Age: 48
End: 2023-09-18
Payer: COMMERCIAL

## 2023-09-18 VITALS
BODY MASS INDEX: 29.52 KG/M2 | TEMPERATURE: 97.8 F | WEIGHT: 230 LBS | SYSTOLIC BLOOD PRESSURE: 146 MMHG | HEART RATE: 65 BPM | RESPIRATION RATE: 19 BRPM | HEIGHT: 74 IN | OXYGEN SATURATION: 97 % | DIASTOLIC BLOOD PRESSURE: 91 MMHG

## 2023-09-18 DIAGNOSIS — R10.84 GENERALIZED ABDOMINAL PAIN: Primary | ICD-10-CM

## 2023-09-18 LAB
ALBUMIN SERPL-MCNC: 3.9 G/DL (ref 3.4–5)
ALBUMIN/GLOB SERPL: 1.1 (ref 0.8–1.7)
ALP SERPL-CCNC: 66 U/L (ref 45–117)
ALT SERPL-CCNC: 73 U/L (ref 16–61)
ANION GAP SERPL CALC-SCNC: 5 MMOL/L (ref 3–18)
APPEARANCE UR: CLEAR
AST SERPL-CCNC: 49 U/L (ref 10–38)
BASOPHILS # BLD: 0.1 K/UL (ref 0–0.1)
BASOPHILS NFR BLD: 1 % (ref 0–2)
BILIRUB SERPL-MCNC: 0.3 MG/DL (ref 0.2–1)
BILIRUB UR QL: NEGATIVE
BUN SERPL-MCNC: 17 MG/DL (ref 7–18)
BUN/CREAT SERPL: 16 (ref 12–20)
CALCIUM SERPL-MCNC: 9.1 MG/DL (ref 8.5–10.1)
CHLORIDE SERPL-SCNC: 113 MMOL/L (ref 100–111)
CO2 SERPL-SCNC: 26 MMOL/L (ref 21–32)
COLOR UR: YELLOW
CREAT SERPL-MCNC: 1.07 MG/DL (ref 0.6–1.3)
DIFFERENTIAL METHOD BLD: ABNORMAL
EOSINOPHIL # BLD: 0.9 K/UL (ref 0–0.4)
EOSINOPHIL NFR BLD: 9 % (ref 0–5)
ERYTHROCYTE [DISTWIDTH] IN BLOOD BY AUTOMATED COUNT: 12.8 % (ref 11.6–14.5)
GLOBULIN SER CALC-MCNC: 3.5 G/DL (ref 2–4)
GLUCOSE SERPL-MCNC: 124 MG/DL (ref 74–99)
GLUCOSE UR STRIP.AUTO-MCNC: NEGATIVE MG/DL
HCT VFR BLD AUTO: 43.6 % (ref 36–48)
HGB BLD-MCNC: 15.1 G/DL (ref 13–16)
HGB UR QL STRIP: NEGATIVE
IMM GRANULOCYTES # BLD AUTO: 0.1 K/UL (ref 0–0.04)
IMM GRANULOCYTES NFR BLD AUTO: 1 % (ref 0–0.5)
KETONES UR QL STRIP.AUTO: ABNORMAL MG/DL
LEUKOCYTE ESTERASE UR QL STRIP.AUTO: NEGATIVE
LIPASE SERPL-CCNC: 158 U/L (ref 73–393)
LYMPHOCYTES # BLD: 3.6 K/UL (ref 0.9–3.6)
LYMPHOCYTES NFR BLD: 34 % (ref 21–52)
MCH RBC QN AUTO: 31.3 PG (ref 24–34)
MCHC RBC AUTO-ENTMCNC: 34.6 G/DL (ref 31–37)
MCV RBC AUTO: 90.5 FL (ref 78–100)
MONOCYTES # BLD: 0.8 K/UL (ref 0.05–1.2)
MONOCYTES NFR BLD: 7 % (ref 3–10)
NEUTS SEG # BLD: 5 K/UL (ref 1.8–8)
NEUTS SEG NFR BLD: 48 % (ref 40–73)
NITRITE UR QL STRIP.AUTO: NEGATIVE
NRBC # BLD: 0 K/UL (ref 0–0.01)
NRBC BLD-RTO: 0 PER 100 WBC
PH UR STRIP: 5.5 (ref 5–8)
PLATELET # BLD AUTO: 234 K/UL (ref 135–420)
PMV BLD AUTO: 9.1 FL (ref 9.2–11.8)
POTASSIUM SERPL-SCNC: 3.9 MMOL/L (ref 3.5–5.5)
PROT SERPL-MCNC: 7.4 G/DL (ref 6.4–8.2)
PROT UR STRIP-MCNC: NEGATIVE MG/DL
RBC # BLD AUTO: 4.82 M/UL (ref 4.35–5.65)
SODIUM SERPL-SCNC: 144 MMOL/L (ref 136–145)
SP GR UR REFRACTOMETRY: 1.03 (ref 1–1.03)
UROBILINOGEN UR QL STRIP.AUTO: 1 EU/DL (ref 0.2–1)
WBC # BLD AUTO: 10.3 K/UL (ref 4.6–13.2)

## 2023-09-18 PROCEDURE — 93005 ELECTROCARDIOGRAM TRACING: CPT

## 2023-09-18 PROCEDURE — 83690 ASSAY OF LIPASE: CPT

## 2023-09-18 PROCEDURE — 85025 COMPLETE CBC W/AUTO DIFF WBC: CPT

## 2023-09-18 PROCEDURE — 81003 URINALYSIS AUTO W/O SCOPE: CPT

## 2023-09-18 PROCEDURE — 96374 THER/PROPH/DIAG INJ IV PUSH: CPT

## 2023-09-18 PROCEDURE — 74176 CT ABD & PELVIS W/O CONTRAST: CPT

## 2023-09-18 PROCEDURE — 6360000002 HC RX W HCPCS

## 2023-09-18 PROCEDURE — 80053 COMPREHEN METABOLIC PANEL: CPT

## 2023-09-18 PROCEDURE — 99284 EMERGENCY DEPT VISIT MOD MDM: CPT

## 2023-09-18 RX ORDER — SUCRALFATE 1 G/1
1 TABLET ORAL 4 TIMES DAILY
Qty: 120 TABLET | Refills: 3 | Status: SHIPPED | OUTPATIENT
Start: 2023-09-18

## 2023-09-18 RX ORDER — ONDANSETRON 2 MG/ML
4 INJECTION INTRAMUSCULAR; INTRAVENOUS ONCE
Status: COMPLETED | OUTPATIENT
Start: 2023-09-18 | End: 2023-09-18

## 2023-09-18 RX ORDER — ONDANSETRON 4 MG/1
4 TABLET, ORALLY DISINTEGRATING ORAL 3 TIMES DAILY PRN
Qty: 21 TABLET | Refills: 0 | Status: SHIPPED | OUTPATIENT
Start: 2023-09-18

## 2023-09-18 RX ADMIN — ONDANSETRON 4 MG: 2 INJECTION INTRAMUSCULAR; INTRAVENOUS at 13:11

## 2023-09-18 ASSESSMENT — ENCOUNTER SYMPTOMS
ABDOMINAL PAIN: 1
ALLERGIC/IMMUNOLOGIC NEGATIVE: 1
EYES NEGATIVE: 1
RESPIRATORY NEGATIVE: 1

## 2023-09-18 ASSESSMENT — PAIN - FUNCTIONAL ASSESSMENT: PAIN_FUNCTIONAL_ASSESSMENT: 0-10

## 2023-09-18 ASSESSMENT — LIFESTYLE VARIABLES
HOW OFTEN DO YOU HAVE A DRINK CONTAINING ALCOHOL: 2-3 TIMES A WEEK
HOW MANY STANDARD DRINKS CONTAINING ALCOHOL DO YOU HAVE ON A TYPICAL DAY: 3 OR 4

## 2023-09-18 ASSESSMENT — PAIN SCALES - GENERAL: PAINLEVEL_OUTOF10: 4

## 2023-09-18 NOTE — ED NOTES
Discharge instructions reviewed with patient. Patient verbalized understanding. Patient advised to follow up as directed on discharge instructions. Patient denies questions, needs or concerns at this time. Patient verbalized understanding. No s/sx of distress noted.         Rosana Davison RN  09/18/23 [id number]

## 2023-09-18 NOTE — ED TRIAGE NOTES
Pt ambulatory through triage with c/o abd pain that started last Saturday. Pt is experiencing heart burn, nausea, and diarrhea.

## 2023-09-18 NOTE — ED PROVIDER NOTES
EMERGENCY DEPARTMENT HISTORY AND PHYSICAL EXAM    3:43 PM      Date: 2023  Patient Name: Singh Cortez    History of Presenting Illness     Chief Complaint   Patient presents with    Abdominal Pain         History Provided By: Patient  Location/Duration/Severity/Modifying factors   atient is a 68-year-old male who is here at the ER today for a bout of food poisoning and a little bit of anxiety that he might have pancreatic cancer because of his \"GERD symptoms\" he states that his grandfather  of pancreatic cancer at the age of 61 and he is nervous that something might be wrong as well. PCP: Tash Martínez MD    No current facility-administered medications for this encounter.      Current Outpatient Medications   Medication Sig Dispense Refill    sucralfate (CARAFATE) 1 GM tablet Take 1 tablet by mouth 4 times daily 120 tablet 3    ondansetron (ZOFRAN-ODT) 4 MG disintegrating tablet Take 1 tablet by mouth 3 times daily as needed for Nausea or Vomiting 21 tablet 0    fexofenadine-pseudoephedrine (ALLEGRA-D 24HR) 180-240 MG per extended release tablet Take 1 tablet by mouth daily      mometasone (NASONEX) 50 MCG/ACT nasal spray 2 sprays daily      permethrin (ELIMITE) 5 % cream apply sparingly as directed      sildenafil (REVATIO) 20 MG tablet TAKE 5 TABLETS BY MOUTH ONE HOUR PRIOR TO SEXUAL ACTIVITY         Past History     Past Medical History:  Past Medical History:   Diagnosis Date    Anxiety with depression     HTN (hypertension)     VD (venereal disease)        Past Surgical History:  Past Surgical History:   Procedure Laterality Date    OTHER SURGICAL HISTORY      vasectomy 2008    SEPTOPLASTY      WISDOM TOOTH EXTRACTION         Family History:  Family History   Problem Relation Age of Onset    Diabetes Paternal Grandmother     Cancer Paternal Grandmother     Cancer Mother        Social History:  Social History     Tobacco Use    Smoking status: Former    Smokeless tobacco: Never   Substance Use

## 2023-09-19 LAB
EKG ATRIAL RATE: 67 BPM
EKG DIAGNOSIS: NORMAL
EKG P AXIS: 15 DEGREES
EKG P-R INTERVAL: 194 MS
EKG Q-T INTERVAL: 388 MS
EKG QRS DURATION: 110 MS
EKG QTC CALCULATION (BAZETT): 409 MS
EKG R AXIS: 10 DEGREES
EKG T AXIS: 35 DEGREES
EKG VENTRICULAR RATE: 67 BPM

## 2023-09-19 PROCEDURE — 93010 ELECTROCARDIOGRAM REPORT: CPT | Performed by: INTERNAL MEDICINE

## 2024-02-08 ENCOUNTER — HOSPITAL ENCOUNTER (OUTPATIENT)
Facility: HOSPITAL | Age: 49
Discharge: HOME | End: 2024-02-08
Attending: FAMILY MEDICINE
Payer: COMMERCIAL

## 2024-02-08 DIAGNOSIS — R10.9 ABDOMINAL PAIN, UNSPECIFIED ABDOMINAL LOCATION: ICD-10-CM

## 2024-02-08 PROCEDURE — 74177 CT ABD & PELVIS W/CONTRAST: CPT

## 2024-02-08 PROCEDURE — 6360000004 HC RX CONTRAST MEDICATION: Performed by: FAMILY MEDICINE

## 2024-02-08 RX ADMIN — IOPAMIDOL 100 ML: 612 INJECTION, SOLUTION INTRAVENOUS at 10:04
